# Patient Record
Sex: MALE | Race: WHITE | NOT HISPANIC OR LATINO | Employment: FULL TIME | URBAN - METROPOLITAN AREA
[De-identification: names, ages, dates, MRNs, and addresses within clinical notes are randomized per-mention and may not be internally consistent; named-entity substitution may affect disease eponyms.]

---

## 2024-04-01 ENCOUNTER — APPOINTMENT (OUTPATIENT)
Dept: GENERAL RADIOLOGY | Facility: HOSPITAL | Age: 24
End: 2024-04-01
Payer: OTHER MISCELLANEOUS

## 2024-04-01 ENCOUNTER — HOSPITAL ENCOUNTER (EMERGENCY)
Facility: HOSPITAL | Age: 24
Discharge: HOME | End: 2024-04-01
Attending: EMERGENCY MEDICINE | Admitting: EMERGENCY MEDICINE
Payer: OTHER MISCELLANEOUS

## 2024-04-01 VITALS
TEMPERATURE: 98.8 F | RESPIRATION RATE: 16 BRPM | OXYGEN SATURATION: 99 % | HEIGHT: 69 IN | HEART RATE: 80 BPM | WEIGHT: 165 LBS | DIASTOLIC BLOOD PRESSURE: 96 MMHG | BODY MASS INDEX: 24.44 KG/M2 | SYSTOLIC BLOOD PRESSURE: 143 MMHG

## 2024-04-01 DIAGNOSIS — S61.412A LACERATION OF LEFT HAND WITHOUT FOREIGN BODY, INITIAL ENCOUNTER: Primary | ICD-10-CM

## 2024-04-01 PROCEDURE — 99283 EMERGENCY DEPT VISIT LOW MDM: CPT

## 2024-04-01 PROCEDURE — 73130 X-RAY EXAM OF HAND: CPT

## 2024-04-01 RX ORDER — LIDOCAINE HYDROCHLORIDE 10 MG/ML
10 INJECTION, SOLUTION EPIDURAL; INFILTRATION; INTRACAUDAL; PERINEURAL ONCE
Status: COMPLETED | OUTPATIENT
Start: 2024-04-01 | End: 2024-04-01

## 2024-04-01 RX ADMIN — LIDOCAINE HYDROCHLORIDE 10 ML: 10 INJECTION, SOLUTION EPIDURAL; INFILTRATION; INTRACAUDAL; PERINEURAL at 13:33

## 2024-04-01 NOTE — ED PROVIDER NOTES
Subjective   History of Present Illness  23-year-old male brought presents the emergency department with a laceration to the right hand.  He was removing an industrial type light bulb when it broke and just grazed across the dorsal aspect of his left hand.  Got a large laceration between the thumb and index finger dorsal side.  He does not think he is getting numbness or tingling to there is a much old blood on his hand he had difficulty telling.  He states he has full range of motion of all fingers that he is tested.  He has no other complaints. no Foreign body sensation.    History provided by:  Patient   used: No    Laceration  Location: Left dorsal hand between index finger and thumb.  Depth:  Through muscle  Quality: straight    Bleeding: venous    Time since incident:  1 hour  Laceration mechanism:  Broken glass  Pain details:     Quality:  Burning    Severity:  Mild    Timing:  Constant    Progression:  Unchanged  Foreign body present:  No foreign bodies  Relieved by:  Pressure  Worsened by:  Nothing  Ineffective treatments:  None tried  Tetanus status:  Up to date  Associated symptoms: no fever, no numbness, no rash, no redness, no swelling and no streaking        Review of Systems   Constitutional:  Negative for fever.   Skin:  Negative for rash.       No past medical history on file.    No Known Allergies    No past surgical history on file.    No family history on file.    Social History     Socioeconomic History   • Marital status: Single           Objective   Physical Exam  Vitals and nursing note reviewed.   Constitutional:       Appearance: He is well-developed.   HENT:      Head: Normocephalic and atraumatic.      Right Ear: External ear normal.      Left Ear: External ear normal.      Nose: Nose normal.   Eyes:      General: No scleral icterus.     Conjunctiva/sclera: Conjunctivae normal.      Pupils: Pupils are equal, round, and reactive to light.   Neck:      Thyroid: No  thyromegaly.   Cardiovascular:      Rate and Rhythm: Regular rhythm.   Pulmonary:      Effort: Pulmonary effort is normal. No respiratory distress.   Abdominal:      Palpations: Abdomen is soft.   Musculoskeletal:        Arms:       Cervical back: Normal range of motion.      Comments: Mild decrease sensation to the thumb to palpation and sharp object.   Lymphadenopathy:      Cervical: No cervical adenopathy.   Skin:     General: Skin is warm and dry.   Neurological:      Mental Status: He is alert and oriented to person, place, and time.      Cranial Nerves: No cranial nerve deficit.      Coordination: Coordination normal.      Deep Tendon Reflexes: Reflexes are normal and symmetric. Reflexes normal.   Psychiatric:         Behavior: Behavior normal.         Thought Content: Thought content normal.         Judgment: Judgment normal.       Laceration Repair    Date/Time: 4/1/2024 1:34 PM    Performed by: Amaury Arevalo PA  Authorized by: Kayden Bonner MD    Consent:     Consent obtained:  Verbal    Consent given by:  Patient    Risks, benefits, and alternatives were discussed: yes      Risks discussed:  Infection, pain, retained foreign body, tendon damage, vascular damage, poor wound healing, poor cosmetic result, need for additional repair and nerve damage  Universal protocol:     Procedure explained and questions answered to patient or proxy's satisfaction: yes      Relevant documents present and verified: yes      Test results available: yes      Imaging studies available: yes      Required blood products, implants, devices, and special equipment available: yes      Site/side marked: yes      Immediately prior to procedure, a time out was called: yes      Patient identity confirmed:  Verbally with patient and arm band  Anesthesia:     Anesthesia method:  Local infiltration    Local anesthetic:  Lidocaine 1% w/o epi  Laceration details:     Location: Left dorsal hand.    Length (cm):   5  Pre-procedure details:     Preparation:  Patient was prepped and draped in usual sterile fashion  Exploration:     Limited defect created (wound extended): no      Hemostasis achieved with:  Direct pressure    Imaging obtained: x-ray      Imaging outcome: foreign body not noted      Wound exploration: wound explored through full range of motion and entire depth of wound visualized      Wound extent: muscle damage      Wound extent: no foreign bodies/material noted, no nerve damage noted, no tendon damage noted, no underlying fracture noted and no vascular damage noted    Treatment:     Area cleansed with:  Povidone-iodine    Amount of cleaning:  Extensive    Irrigation solution:  Sterile saline    Irrigation method:  Syringe    Debridement:  None    Undermining:  None    Scar revision: no      Layers/structures repaired:  Muscle belly  Muscle belly:     Suture size:  5-0    Suture material:  Vicryl    Suture technique:  Simple interrupted    Number of sutures:  3  Skin repair:     Repair method:  Sutures    Suture size:  5-0    Suture material:  Nylon    Suture technique:  Simple interrupted    Number of sutures:  9  Approximation:     Approximation:  Close  Repair type:     Repair type:  Complex  Post-procedure details:     Dressing:  Open (no dressing)    Procedure completion:  Tolerated well, no immediate complications             ED Course  ED Course as of 04/01/24 1336   Mon Apr 01, 2024   1202 XR Hand 3+ View Left  Personally reviewed the 3 views of the left hand.  Some limitation secondary to positioning.  However, no displaced fracture visualized on my interpretation.  No radiopaque foreign body visualized either. [RS]   1203 I personally evaluated the patient in triage jobs.  I confirmed his tetanus status is up-to-date.  Patient transitioned over to's blood flow for further evaluation.  I updated the physicians assistant on the history and initial findings. [RS]   1336 X-rays were negative.  I discussed  "this with Dr. Gao hand surgery on-call she is going to follow the patient in the office.  We discussed wound care keeping this clean and dry sutures come out in 10 days [CHERY]      ED Course User Index  [CHERY] Amaury Arevalo PA  [RS] Kayden Bonner MD                                 No results found for this or any previous visit (from the past 24 hour(s)).  Note: In addition to lab results from this visit, the labs listed above may include labs taken at another facility or during a different encounter within the last 24 hours. Please correlate lab times with ED admission and discharge times for further clarification of the services performed during this visit.    XR Hand 3+ View Left   Final Result   Impression:   No acute osseous abnormality of the left hand.         Electronically Signed: Poonam Rizo MD     4/1/2024 11:34 AM EDT     Workstation ID: [id number]        Vitals:    04/01/24 1056   BP: 143/96   Pulse: 80   Resp: 16   Temp: 98.8 °F (37.1 °C)   TempSrc: Oral   SpO2: 99%   Weight: 74.8 kg (165 lb)   Height: 175.3 cm (69\")     Medications   lidocaine PF 1% (XYLOCAINE) injection 10 mL (10 mL Infiltration Given by Other 4/1/24 1333)     ECG/EMG Results (last 24 hours)       ** No results found for the last 24 hours. **          No orders to display                   Medical Decision Making  Problems Addressed:  Laceration of left hand without foreign body, initial encounter: complicated acute illness or injury    Amount and/or Complexity of Data Reviewed  Radiology: ordered. Decision-making details documented in ED Course.    Risk  Prescription drug management.        Final diagnoses:   Laceration of left hand without foreign body, initial encounter       ED Disposition  ED Disposition       ED Disposition   Discharge    Condition   Stable    Comment   --               Celeste Gao MD  [address] [place], [place] [id number] [phone]      Call for appointment       "   Medication List      No changes were made to your prescriptions during this visit.            Amaury Arevalo PA  04/02/24 6765

## 2024-04-01 NOTE — Clinical Note
[facility] EMERGENCY DEPARTMENT  [address]  formerly [facility] [address]  Phone: [phone]    Manuel Iyer was seen and treated in our emergency department on 4/1/2024.  He may return to work on 04/03/2024.         Thank you for choosing [facility].    Amaury Arevalo PA

## 2024-04-04 ENCOUNTER — OFFICE VISIT (OUTPATIENT)
Dept: ORTHOPEDIC SURGERY | Facility: CLINIC | Age: 24
End: 2024-04-04
Payer: COMMERCIAL

## 2024-04-04 VITALS
DIASTOLIC BLOOD PRESSURE: 82 MMHG | BODY MASS INDEX: 24.36 KG/M2 | SYSTOLIC BLOOD PRESSURE: 120 MMHG | HEIGHT: 69 IN | WEIGHT: 164.46 LBS

## 2024-04-04 DIAGNOSIS — S61.412A LACERATION OF LEFT HAND, FOREIGN BODY PRESENCE UNSPECIFIED, INITIAL ENCOUNTER: Primary | ICD-10-CM

## 2024-04-04 RX ORDER — FINASTERIDE 1 MG/1
1 TABLET, FILM COATED ORAL DAILY
COMMUNITY

## 2024-04-04 NOTE — PROGRESS NOTES
[facility]     Office Visit       Date: 04/04/2024   Patient Name: Manuel Iyer  MRN: [id number]  YOB: 2000    Referring Physician: Kayden Bonner MD     Chief Complaint:   Chief Complaint   Patient presents with    Left Hand - Pain     Laceration DOI 4/1/24     History of Present Illness:   Manuel Iyer is a 23 y.o. male right-hand-dominant send to clinic with complaints of left dorsal hand laceration.  Patient reports that he was at work on 4/1/2024 when his left hand was struck the light fixture.  He sustained a laceration and presented to [facility] emergency department.  He underwent suture repair at that time.  No updated tetanus or antibiotics were given.  He presents today for follow-up.  He reports mild pain.  No wound complications.  There has been no draining erythema or warmth.  He is in using the extremity.  His swelling has improved.  He denies any dense numbness or tingling.  No other complaints or concerns.    Subjective   Review of Systems:   Review of Systems   Constitutional:  Negative for chills, fever, unexpected weight gain and unexpected weight loss.   HENT:  Negative for congestion, postnasal drip and rhinorrhea.    Eyes:  Negative for blurred vision.   Respiratory:  Negative for shortness of breath.    Cardiovascular:  Negative for leg swelling.   Gastrointestinal:  Negative for abdominal pain, nausea and vomiting.   Genitourinary:  Negative for difficulty urinating.   Musculoskeletal:  Positive for arthralgias. Negative for gait problem, joint swelling and myalgias.   Skin:  Negative for skin lesions and wound.   Neurological:  Negative for dizziness, weakness, light-headedness and numbness.   Hematological:  Does not bruise/bleed easily.   Psychiatric/Behavioral:  Negative for depressed mood.       Pertinent review of systems per HPI    I reviewed the patient's chief complaint, history  "of present illness, review of systems, past medical history, surgical history, family history, social history, medications and allergy list in the EMR on 04/04/2024 and agree with the findings above.    Objective    Quality Measures:   ACP:   ACP discussion was declined by the patient.      Tobacco:   Manuel Iyer  reports that he has never smoked. He has never been exposed to tobacco smoke. He has never used smokeless tobacco.     Vital Signs:   Vitals:    04/04/24 1301   BP: 120/82   Weight: 74.6 kg (164 lb 7.4 oz)   Height: 175.3 cm (69.02\")     BMI: BMI is within normal parameters. No other follow-up for BMI required.     General: No acute distress. Alert and oriented.     Ortho Exam:  Examination of the left upper extremity demonstrates a 5 cm longitudinal laceration along the ulnar aspect of the first webspace.  Nylon sutures are in place.  There is no evidence of dehiscence, drainage, or erythema.  This is nontender to palpation.  Diffuse swelling and ecchymosis noted throughout the the hand.  He is able to make a complete fist and she has full extension of the digits.  Sensation is intact to light touch throughout the first webspace with 2 point discrimination of 5 mm.  Warm and well-perfused distally.    Imaging / Studies:    Imaging Results (Last 24 Hours)       ** No results found for the last 24 hours. **        Left hand x-rays obtained on 4/1/2024 personally reviewed and interpreted by myself.  These demonstrate no acute fractures or dislocations.    Assessment / Plan    Assessment/Plan:   Manuel Iyer is a 23 y.o. male with left dorsal hand laceration, DOI 4/1/2024.    I discussed with the patient their clinical and radiographic findings demonstrate a left hand dorsal laceration.  We had a discussion regarding the pathophysiology of their diagnosis.  Patient's laceration appears to be healing well.  I like to keep sutures in place for another 1 week prior to removal.  We will get him set up for a " tetanus booster.  I do not feel that the patient needs antibiotics at this time as there are no signs of infection.  Encouraged digit range of motion.  I will see him back in 1 week for reevaluation and suture removal.  They were agreeable with the plan.  All questions and concerns were addressed.       ICD-10-CM ICD-9-CM   1. Laceration of left hand, foreign body presence unspecified, initial encounter  S61.412A 882.0     Follow Up:   Return in about 1 week (around 4/11/2024) for Follow Up.      Celeste Gao MD  [facility]

## 2024-04-05 ENCOUNTER — PATIENT ROUNDING (BHMG ONLY) (OUTPATIENT)
Dept: ORTHOPEDIC SURGERY | Facility: CLINIC | Age: 24
End: 2024-04-05
Payer: COMMERCIAL

## 2024-04-05 NOTE — PROGRESS NOTES
April 5, 2024    Hello, may I speak with Manuel Bobbiilsa?    My name is Genevieve      I am  with [facility]  [address]  [facility] [address].    Before we get started may I verify your date of birth? 2000    I am calling to officially welcome you to our practice and ask about your recent visit. Is this a good time to talk? yes    Tell me about your visit with us. What things went well? Overall, had a great office experience.       We're always looking for ways to make our patients' experiences even better. Do you have recommendations on ways we may improve?  yes. There was a challenge with getting a timely appointment via our HUB department.  However, he was able to stop by our office and get an appointment scheduled with ease.    Overall were you satisfied with your first visit to our practice? yes       I appreciate you taking the time to speak with me today. Is there anything else I can do for you? no      Thank you, and have a great day.

## 2024-04-11 ENCOUNTER — OFFICE VISIT (OUTPATIENT)
Dept: ORTHOPEDIC SURGERY | Facility: CLINIC | Age: 24
End: 2024-04-11
Payer: COMMERCIAL

## 2024-04-11 VITALS
HEIGHT: 69 IN | BODY MASS INDEX: 24.88 KG/M2 | WEIGHT: 168 LBS | DIASTOLIC BLOOD PRESSURE: 82 MMHG | SYSTOLIC BLOOD PRESSURE: 118 MMHG

## 2024-04-11 DIAGNOSIS — S61.412D LACERATION OF LEFT HAND WITHOUT FOREIGN BODY, SUBSEQUENT ENCOUNTER: Primary | ICD-10-CM

## 2024-04-11 NOTE — PROGRESS NOTES
[facility]     Office Visit       Date: 04/11/2024   Patient Name: Manuel Iyer  MRN: [id number]  YOB: 2000    Referring Physician: No ref. provider found     Chief Complaint:   Chief Complaint   Patient presents with    Follow-up     1 week follow-up-- left dorsal hand laceration, DOI (4/1/2024).       History of Present Illness:   Manuel Iyer is a 23 y.o. male right-hand-dominant presented to clinic for follow-up of left dorsal hand laceration, DOI 4/1/2024.  Patient is been doing well since his last clinic visit.  His pain and swelling are improving.  He denies any wound complications.  There has been slight bloody drainage but no other complaints.  There is a small area of numbness along the traumatic laceration that is slowly improving.  He received a tetanus shot after his last clinic visit.  He has not been on any antibiotics.  No other complaints or concerns.    Subjective   Review of Systems:   Review of Systems   Constitutional: Negative.  Negative for chills, fatigue and fever.   HENT: Negative.  Negative for congestion and dental problem.    Eyes: Negative.  Negative for blurred vision.   Respiratory: Negative.  Negative for shortness of breath.    Cardiovascular: Negative.  Negative for leg swelling.   Gastrointestinal: Negative.  Negative for abdominal pain.   Endocrine: Negative.  Negative for polyuria.   Genitourinary: Negative.  Negative for difficulty urinating.   Musculoskeletal:  Positive for arthralgias.   Skin: Negative.    Allergic/Immunologic: Negative.    Neurological: Negative.    Hematological: Negative.  Negative for adenopathy.   Psychiatric/Behavioral: Negative.  Negative for behavioral problems.       Pertinent review of systems per HPI    I reviewed the patient's chief complaint, history of present illness, review of systems, past medical history, surgical history, family history, social  "history, medications and allergy list in the EMR on 04/11/2024 and agree with the findings above.    Objective    Quality Measures:   ACP:   ACP discussion was declined by the patient.      Tobacco:   Manuel Iyer  reports that he has never smoked. He has never been exposed to tobacco smoke. He has never used smokeless tobacco.     Vital Signs:   Vitals:    04/11/24 0945   BP: 118/82   Weight: 76.2 kg (168 lb)   Height: 175.3 cm (69.02\")     BMI: BMI is within normal parameters. No other follow-up for BMI required.     General: No acute distress. Alert and oriented.     Ortho Exam:  Examination left upper extremity demonstrates a healing traumatic laceration that is possibly 5 cm long within the first webspace.  Sutures are in place.  These were removed today in clinic.  There is no evidence of dehiscence, drainage, or erythema.  There is a small area of numbness along the laceration distally and radially.  Sensation is intact throughout the index and long fingers as well as within the first webspace.  He is able make composite fist.  Warm and well-perfused distally.    Imaging / Studies:    Imaging Results (Last 24 Hours)       ** No results found for the last 24 hours. **        Left hand x-rays obtained on 4/1/2024 personally reviewed and interpreted by myself. These demonstrate no acute fractures or dislocations.    Assessment / Plan    Assessment/Plan:   Manuel Iyer is a 23 y.o. male with left dorsal hand laceration, DOI 4/1/2024.     Patient is done well since his last clinic visit.  The sutures that were placed in the emergency department were removed by myself today in clinic.  I counseled him on keeping the laceration clean dry and covered.  He may begin to resume his activities as he tolerates.  I will see him back in 1 month for reevaluation of wound healing hand motion.  He may cancel his appointment if he is doing well.  All questions and concerns were addressed.  He was agreeable to the plan.      " ICD-10-CM ICD-9-CM   1. Laceration of left hand without foreign body, subsequent encounter  S61.412D V58.89     Follow Up:   Return in about 4 weeks (around 5/9/2024) for Follow Up- May cancel if doing well.      Celeste Gao MD  [facility]

## 2024-04-12 ENCOUNTER — TELEPHONE (OUTPATIENT)
Dept: ORTHOPEDIC SURGERY | Facility: CLINIC | Age: 24
End: 2024-04-12
Payer: COMMERCIAL

## 2024-04-12 NOTE — TELEPHONE ENCOUNTER
Patient came in and I put new steri's on, non stick pad and coban per Dr. Gao's instructions. Patient is coming back next Thursday to follow up.  Nuris Dewey RT (R), ROT

## 2024-04-12 NOTE — TELEPHONE ENCOUNTER
Patients stitches were removed yesterday and had tape placed over. Patient advising his it seems like wound is opening back up and has bled.     Please advise

## 2024-04-12 NOTE — TELEPHONE ENCOUNTER
I called patient and he states the hand laceration was bleeding through the steri strips that were put on yesterday so he removed them and is concerned that the laceration is opening back up. Please see attached pictures and advise thank you!  Nuris Dewey RT (R), ROT

## 2024-04-18 ENCOUNTER — OFFICE VISIT (OUTPATIENT)
Dept: ORTHOPEDIC SURGERY | Facility: CLINIC | Age: 24
End: 2024-04-18
Payer: COMMERCIAL

## 2024-04-18 VITALS
SYSTOLIC BLOOD PRESSURE: 122 MMHG | WEIGHT: 167.99 LBS | HEIGHT: 69 IN | BODY MASS INDEX: 24.88 KG/M2 | DIASTOLIC BLOOD PRESSURE: 80 MMHG

## 2024-04-18 DIAGNOSIS — S61.412D LACERATION OF LEFT HAND WITHOUT FOREIGN BODY, SUBSEQUENT ENCOUNTER: Primary | ICD-10-CM
